# Patient Record
Sex: MALE | Race: WHITE | NOT HISPANIC OR LATINO | Employment: UNEMPLOYED | URBAN - METROPOLITAN AREA
[De-identification: names, ages, dates, MRNs, and addresses within clinical notes are randomized per-mention and may not be internally consistent; named-entity substitution may affect disease eponyms.]

---

## 2019-01-29 ENCOUNTER — HOSPITAL ENCOUNTER (OUTPATIENT)
Dept: LAB | Facility: HOSPITAL | Age: 54
Discharge: HOME | End: 2019-01-29
Attending: NURSE PRACTITIONER | Admitting: NURSE PRACTITIONER

## 2019-01-29 LAB
ALBUMIN SERPL-MCNC: 4 G/DL (ref 3.5–4.8)
ALBUMIN/GLOB SERPL: 1.3 {RATIO} (ref 1–1.7)
ALP SERPL-CCNC: 60 IU/L (ref 32–91)
ALT SERPL-CCNC: 16 IU/L (ref 17–63)
ANION GAP SERPL CALC-SCNC: 13.8 MMOL/L (ref 10–20)
AST SERPL-CCNC: 23 IU/L (ref 15–41)
BASOPHILS # BLD AUTO: 0.1 10*3/UL (ref 0–0.2)
BASOPHILS NFR BLD AUTO: 1 % (ref 0–2)
BILIRUB SERPL-MCNC: 0.7 MG/DL (ref 0.3–1.2)
BUN SERPL-MCNC: 7 MG/DL (ref 8–20)
BUN/CREAT SERPL: 7 (ref 6.2–20.3)
CALCIUM SERPL-MCNC: 9.1 MG/DL (ref 8.9–10.3)
CHLORIDE SERPL-SCNC: 103 MMOL/L (ref 101–111)
CHOLEST SERPL-MCNC: 228 MG/DL
CHOLEST/HDLC SERPL: 5.3 {RATIO}
CONV CO2: 23 MMOL/L (ref 22–32)
CONV LDL CHOLESTEROL DIRECT: 176 MG/DL (ref 0–100)
CONV TOTAL PROTEIN: 7.2 G/DL (ref 6.1–7.9)
CREAT UR-MCNC: 1 MG/DL (ref 0.7–1.2)
DIFFERENTIAL METHOD BLD: (no result)
EOSINOPHIL # BLD AUTO: 0.1 10*3/UL (ref 0–0.3)
EOSINOPHIL # BLD AUTO: 1 % (ref 0–3)
ERYTHROCYTE [DISTWIDTH] IN BLOOD BY AUTOMATED COUNT: 13.9 % (ref 11.5–14.5)
GLOBULIN UR ELPH-MCNC: 3.2 G/DL (ref 2.5–3.8)
GLUCOSE SERPL-MCNC: 99 MG/DL (ref 65–99)
HCT VFR BLD AUTO: 45.7 % (ref 40–54)
HDLC SERPL-MCNC: 43 MG/DL
HGB BLD-MCNC: 15.3 G/DL (ref 14–18)
LDLC/HDLC SERPL: 4.1 {RATIO}
LIPID INTERPRETATION: ABNORMAL
LYMPHOCYTES # BLD AUTO: 2.2 10*3/UL (ref 0.8–4.8)
LYMPHOCYTES NFR BLD AUTO: 27 % (ref 18–42)
MCH RBC QN AUTO: 28.5 PG (ref 26–32)
MCHC RBC AUTO-ENTMCNC: 33.5 G/DL (ref 32–36)
MCV RBC AUTO: 85 FL (ref 80–94)
MONOCYTES # BLD AUTO: 0.7 10*3/UL (ref 0.1–1.3)
MONOCYTES NFR BLD AUTO: 8 % (ref 2–11)
NEUTROPHILS # BLD AUTO: 5.2 10*3/UL (ref 2.3–8.6)
NEUTROPHILS NFR BLD AUTO: 63 % (ref 50–75)
NRBC BLD AUTO-RTO: 0 /100{WBCS}
NRBC/RBC NFR BLD MANUAL: 0 10*3/UL
PLATELET # BLD AUTO: 462 10*3/UL (ref 150–450)
PMV BLD AUTO: 7.3 FL (ref 7.4–10.4)
POTASSIUM SERPL-SCNC: 3.8 MMOL/L (ref 3.6–5.1)
RBC # BLD AUTO: 5.38 10*6/UL (ref 4.6–6)
SODIUM SERPL-SCNC: 136 MMOL/L (ref 136–144)
TRIGL SERPL-MCNC: 86 MG/DL
VLDLC SERPL CALC-MCNC: 8.8 MG/DL
WBC # BLD AUTO: 8.2 10*3/UL (ref 4.5–11.5)

## 2019-02-12 ENCOUNTER — HOSPITAL ENCOUNTER (OUTPATIENT)
Dept: GENERAL RADIOLOGY | Facility: HOSPITAL | Age: 54
Discharge: HOME | End: 2019-02-12
Attending: NURSE PRACTITIONER | Admitting: NURSE PRACTITIONER

## 2021-05-20 ENCOUNTER — OFFICE VISIT CONVERTED (OUTPATIENT)
Dept: FAMILY MEDICINE CLINIC | Facility: CLINIC | Age: 56
End: 2021-05-20
Attending: FAMILY MEDICINE

## 2021-05-20 ENCOUNTER — HOSPITAL ENCOUNTER (OUTPATIENT)
Dept: FAMILY MEDICINE CLINIC | Facility: CLINIC | Age: 56
Discharge: HOME | End: 2021-05-20
Attending: FAMILY MEDICINE

## 2021-05-20 LAB
ALBUMIN SERPL-MCNC: 4.4 G/DL (ref 3.5–5)
ALBUMIN/GLOB SERPL: 1.1 {RATIO} (ref 1.4–2.6)
ALP SERPL-CCNC: 73 U/L (ref 56–119)
ALT SERPL-CCNC: 9 U/L (ref 10–40)
ANION GAP SERPL CALC-SCNC: 18 MMOL/L (ref 8–19)
AST SERPL-CCNC: 14 U/L (ref 15–50)
BASOPHILS # BLD AUTO: 0.08 10*3/UL (ref 0–0.2)
BASOPHILS NFR BLD AUTO: 0.7 % (ref 0–3)
BILIRUB SERPL-MCNC: 0.35 MG/DL (ref 0.2–1.3)
BUN SERPL-MCNC: 14 MG/DL (ref 5–25)
BUN/CREAT SERPL: 15 {RATIO} (ref 6–20)
CALCIUM SERPL-MCNC: 9.7 MG/DL (ref 8.7–10.4)
CHLORIDE SERPL-SCNC: 102 MMOL/L (ref 99–111)
CHOLEST SERPL-MCNC: 240 MG/DL (ref 107–200)
CHOLEST/HDLC SERPL: 4.7 {RATIO} (ref 3–6)
CONV ABS IMM GRAN: 0.04 10*3/UL (ref 0–0.2)
CONV CO2: 23 MMOL/L (ref 22–32)
CONV IMMATURE GRAN: 0.3 % (ref 0–1.8)
CONV TOTAL PROTEIN: 8.3 G/DL (ref 6.3–8.2)
CREAT UR-MCNC: 0.96 MG/DL (ref 0.7–1.2)
DEPRECATED RDW RBC AUTO: 43.2 FL (ref 35.1–43.9)
EOSINOPHIL # BLD AUTO: 0.8 10*3/UL (ref 0–0.7)
EOSINOPHIL # BLD AUTO: 6.6 % (ref 0–7)
ERYTHROCYTE [DISTWIDTH] IN BLOOD BY AUTOMATED COUNT: 13.9 % (ref 11.6–14.4)
FOLATE SERPL-MCNC: 11 NG/ML (ref 4.8–20)
GFR SERPLBLD BASED ON 1.73 SQ M-ARVRAT: >60 ML/MIN/{1.73_M2}
GLOBULIN UR ELPH-MCNC: 3.9 G/DL (ref 2–3.5)
GLUCOSE SERPL-MCNC: 93 MG/DL (ref 70–99)
HCT VFR BLD AUTO: 44 % (ref 42–52)
HDLC SERPL-MCNC: 51 MG/DL (ref 40–60)
HGB BLD-MCNC: 14.2 G/DL (ref 14–18)
LDLC SERPL CALC-MCNC: 173 MG/DL (ref 70–100)
LYMPHOCYTES # BLD AUTO: 1.56 10*3/UL (ref 1–5)
LYMPHOCYTES NFR BLD AUTO: 12.9 % (ref 20–45)
MCH RBC QN AUTO: 27.7 PG (ref 27–31)
MCHC RBC AUTO-ENTMCNC: 32.3 G/DL (ref 33–37)
MCV RBC AUTO: 85.9 FL (ref 80–96)
MONOCYTES # BLD AUTO: 0.88 10*3/UL (ref 0.2–1.2)
MONOCYTES NFR BLD AUTO: 7.3 % (ref 3–10)
NEUTROPHILS # BLD AUTO: 8.72 10*3/UL (ref 2–8)
NEUTROPHILS NFR BLD AUTO: 72.2 % (ref 30–85)
NRBC CBCN: 0 % (ref 0–0.7)
OSMOLALITY SERPL CALC.SUM OF ELEC: 288 MOSM/KG (ref 273–304)
PLATELET # BLD AUTO: 554 10*3/UL (ref 130–400)
PMV BLD AUTO: 9.4 FL (ref 9.4–12.4)
POTASSIUM SERPL-SCNC: 4.2 MMOL/L (ref 3.5–5.3)
PSA SERPL-MCNC: 1.96 NG/ML (ref 0–4)
RBC # BLD AUTO: 5.12 10*6/UL (ref 4.7–6.1)
SODIUM SERPL-SCNC: 139 MMOL/L (ref 135–147)
T4 FREE SERPL-MCNC: 1.1 NG/DL (ref 0.9–1.8)
TRIGL SERPL-MCNC: 79 MG/DL (ref 40–150)
TSH SERPL-ACNC: 2.3 M[IU]/L (ref 0.27–4.2)
VIT B12 SERPL-MCNC: 273 PG/ML (ref 211–911)
VLDLC SERPL-MCNC: 16 MG/DL (ref 5–37)
WBC # BLD AUTO: 12.08 10*3/UL (ref 4.8–10.8)

## 2021-05-27 ENCOUNTER — OFFICE VISIT CONVERTED (OUTPATIENT)
Dept: FAMILY MEDICINE CLINIC | Facility: CLINIC | Age: 56
End: 2021-05-27
Attending: FAMILY MEDICINE

## 2021-05-27 ENCOUNTER — HOSPITAL ENCOUNTER (OUTPATIENT)
Dept: FAMILY MEDICINE CLINIC | Facility: CLINIC | Age: 56
Discharge: HOME | End: 2021-05-27
Attending: FAMILY MEDICINE

## 2021-05-27 LAB
BASOPHILS # BLD AUTO: 0.09 10*3/UL (ref 0–0.2)
BASOPHILS NFR BLD AUTO: 0.5 % (ref 0–3)
CONV ABS IMM GRAN: 0.08 10*3/UL (ref 0–0.2)
CONV IMMATURE GRAN: 0.4 % (ref 0–1.8)
DEPRECATED RDW RBC AUTO: 42.3 FL (ref 35.1–43.9)
EOSINOPHIL # BLD AUTO: 0.15 10*3/UL (ref 0–0.7)
EOSINOPHIL # BLD AUTO: 0.8 % (ref 0–7)
ERYTHROCYTE [DISTWIDTH] IN BLOOD BY AUTOMATED COUNT: 13.7 % (ref 11.6–14.4)
HCT VFR BLD AUTO: 43.6 % (ref 42–52)
HGB BLD-MCNC: 14.1 G/DL (ref 14–18)
LYMPHOCYTES # BLD AUTO: 2.81 10*3/UL (ref 1–5)
LYMPHOCYTES NFR BLD AUTO: 15.7 % (ref 20–45)
MCH RBC QN AUTO: 27.4 PG (ref 27–31)
MCHC RBC AUTO-ENTMCNC: 32.3 G/DL (ref 33–37)
MCV RBC AUTO: 84.8 FL (ref 80–96)
MONOCYTES # BLD AUTO: 2.19 10*3/UL (ref 0.2–1.2)
MONOCYTES NFR BLD AUTO: 12.3 % (ref 3–10)
NEUTROPHILS # BLD AUTO: 12.53 10*3/UL (ref 2–8)
NEUTROPHILS NFR BLD AUTO: 70.3 % (ref 30–85)
NRBC CBCN: 0 % (ref 0–0.7)
PLATELET # BLD AUTO: 706 10*3/UL (ref 130–400)
PMV BLD AUTO: 9.2 FL (ref 9.4–12.4)
RBC # BLD AUTO: 5.14 10*6/UL (ref 4.7–6.1)
WBC # BLD AUTO: 17.85 10*3/UL (ref 4.8–10.8)

## 2021-06-01 ENCOUNTER — HOSPITAL ENCOUNTER (OUTPATIENT)
Dept: OTHER | Facility: HOSPITAL | Age: 56
Discharge: HOME | End: 2021-06-01
Attending: FAMILY MEDICINE

## 2021-06-02 ENCOUNTER — TRANSCRIBE ORDERS (OUTPATIENT)
Dept: FAMILY MEDICINE CLINIC | Facility: CLINIC | Age: 56
End: 2021-06-02

## 2021-06-02 DIAGNOSIS — Z79.899 DRUG THERAPY: Primary | ICD-10-CM

## 2021-06-05 NOTE — PROGRESS NOTES
Progress Note      Patient Name: Eric Lopez   Patient ID: [id number]   Sex: Male   YOB: 1965        Visit Date: May 27, 2021    Provider: Bobby Monsivais MD   [place] [facility] [place] [address] [place] [address]   [place]  [address]   Location Phone: [phone]          Chief Complaint     1 WEEK FOLLOW UP ON BP       History Of Present Illness  Eric Lopez is a 56 year old /White male who presents for evaluation and treatment of:      discussed labs  vitamin b12 deficiency  leukocytosis  thrombocytosis- if still elevated will refer to hematology  HTN- controlled  hyperlipidemia- uncontrolled- aha risk 10.8 %- discussed R&B of statin- pt will stop med and call us if he has severe muscle cramps when he starts med- will get repeat CMP in 1 month to check liver  pt tolerating meds well       Past Medical History  Disease Name Date Onset Notes   Asthma --  --    COPD (chronic obstructive pulmonary disease) --  --    Lipoma --  --          Past Surgical History  Procedure Name Date Notes   Lipoma Removal --  --    Teeth extraction --  --          Medication List  Name Date Started Instructions   albuterol sulfate 90 mcg/actuation inhalation HFA aerosol inhaler  --    cyclobenzaprine 5 mg oral tablet  take 1 tablet (5 mg) by oral route once daily   Cymbalta 30 mg oral capsule,delayed release(/EC) 05/20/2021 take 1 capsule (30 mg) by oral route once daily for 30 days   lisinopril 10 mg oral tablet 05/20/2021 take 1 tablet (10 mg) by oral route once daily for 30 days   metoprolol tartrate 25 mg oral tablet 05/20/2021 take 1 tablet (25 mg) by oral route 2 times per day for 30 days   Symbicort 160-4.5 mcg/actuation inhalation HFA aerosol inhaler 05/20/2021 inhale 2 puffs by inhalation route 2 times per day in the morning and evening for 30 days   Ventolin HFA 90 mcg/actuation inhalation HFA aerosol inhaler 05/20/2021 inhale 1 - 2 puffs (90 - 180  mcg) by inhalation route every 6 hours as needed for 30 days         Allergy List  Allergen Name Date Reaction Notes   NO KNOWN DRUG ALLERGIES --  --  --          Social History  Finding Status Start/Stop Quantity Notes   Alcohol Former --/-- --  quit at age 36   Caffeine Current some day --/-- --  --    Marijuana Current every day --/-- --  --    Tobacco Former --/-- --  quit 2016         Review of Systems  · Constitutional  o Denies  o : fatigue, fever  · Cardiovascular  o Denies  o : chest pain, palpitations  · Respiratory  o Admits  o : cough  o Denies  o : shortness of breath  · Psychiatric  o Denies  o : anxiety, depression      Vitals  Date Time BP Position Site L\R Cuff Size HR RR TEMP (F) WT  HT  BMI kg/m2 BSA m2 O2 Sat FR L/min FiO2 HC       05/27/2021 02:52 /76 Sitting    73 - R  96.6 227lbs 2oz    97 %            Physical Examination  · Constitutional  o Appearance  o : well developed, well-nourished, in no acute distress  · Respiratory  o Respiratory Effort  o : breathing unlabored  o Auscultation of Lungs  o : clear to ascultation  · Cardiovascular  o Heart  o :   § Auscultation of Heart  § : regular rate and rhythm  o Peripheral Vascular System  o :   § Extremities  § : no edema  · Gastrointestinal  o Abdomen  o : soft, non-tender, non-distended, + bowel sounds, no hepatosplenomegaly, no masses palpated  · Musculoskeletal  o General  o :   § General Musculoskeletal  § : No joint swelling or deformity., Muscle tone, strength, and development grossly normal.  · Neurologic  o Gait and Station  o :   § Gait Screening  § : normal gait  · Psychiatric  o Mood and Affect  o : mood normal, affect appropriate          Assessment  · Vitamin B12 deficiency     266.2/E53.8  · Hyperlipidemia     272.4/E78.5  · Leukocytosis     288.60/D72.829  · Thrombocytosis     238.71/D47.3  · Drug therapy     V58.69/Z79.899      Plan  · Orders  o CBC with Auto Diff [facility] (01426) - 266.2/E53.8, 288.60/D72.829, 238.71/D47.3 -  05/27/2021  o CMP [facility] ([id number]) - V58.69/Z79.899 - 06/27/2021  o ACO-39: Current medications updated and reviewed (1159F, ) - - 05/27/2021  · Medications  o cyanocobalamin (vitamin B-12) 1,000 mcg/mL injection solution   SIG: inject 1 milliliter (1,000 mcg) by subcutaneous route once a month for 30 days   DISP: (1) Milliliter with 5 refills  Prescribed on 05/27/2021     o aspirin 81 mg oral tablet,delayed release (DR/EC)   SIG: take 1 tablet (81 mg) by oral route once daily for 30 days   DISP: (30) Tablet with 5 refills  Prescribed on 05/27/2021     o atorvastatin 10 mg oral tablet   SIG: take 1 tablet (10 mg) by oral route once daily at bedtime for 30 days   DISP: (30) Tablet with 5 refills  Prescribed on 05/27/2021     o cephalexin 500 mg oral capsule   SIG: take 1 capsule (500 mg) by oral route every 6 hours for 7 days   DISP: (28) Capsule with 0 refills  Prescribed on 05/27/2021     o Probiotic 20 billion cell oral capsule   SIG: take 1 capsule by oral route 2 times a day for 10 days   DISP: (20) Capsule with 0 refills  Prescribed on 05/27/2021     o Symbicort 160-4.5 mcg/actuation inhalation HFA aerosol inhaler   SIG: inhale 2 puffs by inhalation route 2 times per day in the morning and evening for 30 days   DISP: (1) Inhaler with 5 refills  Adjusted on 05/27/2021     o Medications have been Reconciled  o Transition of Care or Provider Policy  · Instructions  o Advised that cheeses and other sources of dairy fats, animal fats, fast food, and the extras (candy, pasteries, pies, doughnuts and cookies) all contain LDL raising nutrients. Advised to increase fruits, vegetables, whole grains, and to monitor portion sizes.   o Patient was educated/instructed on their diagnosis, treatment and medications prior to discharge from the clinic today.            Electronically Signed by: Bobby Monsivais MD -Author on May 27, 2021 03:21:45 PM

## 2021-06-05 NOTE — PROGRESS NOTES
Progress Note      Patient Name: Eric Lopez   Patient ID: [id number]   Sex: Male   YOB: 1965        Visit Date: May 20, 2021    Provider: Bobby Monsivais MD   [place] [facility] [place] [address] [place] [address]   [place]  [address]   Location Phone: [phone]          Chief Complaint     New patient established. Out of all medications for 1.5 years. Painful lipomas under right arm pit x four months.       History Of Present Illness  Eric Lopez is a 56 year old /White male who presents for evaluation and treatment of:      pt last saw PMD 2 yrs ago- pt has been out of meds for quite some time  pt is here to get established  pt has h/o asthma, h/o palpitations, h/o lipomas since 2001    pt smoked for 40 yrs 1-2 ppd- quit 5 yrs ago  pt has had chest wall pain with deep breathing x 2 months  HTN- uncontrolled  pt has long h/o depression due to pain from lipomas    EKG: NSR, no ST or T wave changes  xrays:NAD       Past Medical History  Disease Name Date Onset Notes   Asthma --  --    COPD (chronic obstructive pulmonary disease) --  --    Lipoma --  --          Past Surgical History  Procedure Name Date Notes   Lipoma Removal --  --    Teeth extraction --  --          Medication List  Name Date Started Instructions   albuterol sulfate 90 mcg/actuation inhalation HFA aerosol inhaler  --    cyclobenzaprine 5 mg oral tablet  take 1 tablet (5 mg) by oral route once daily   metoprolol tartrate 25 mg oral tablet 05/20/2021 take 1 tablet (25 mg) by oral route 2 times per day for 30 days   Symbicort 160-4.5 mcg/actuation inhalation HFA aerosol inhaler 05/20/2021 inhale 2 puffs by inhalation route 2 times per day in the morning and evening for 30 days         Allergy List  Allergen Name Date Reaction Notes   NO KNOWN DRUG ALLERGIES --  --  --          Social History  Finding Status Start/Stop Quantity Notes   Alcohol Former --/-- --  quit at age 36  "  Caffeine Current some day --/-- --  --    Marijuana Current every day --/-- --  --    Tobacco Former --/-- --  quit 2016         Review of Systems  · Constitutional  o Admits  o : fatigue  o Denies  o : fever, chills  · HENT  o Denies  o : nasal congestion, nasal discharge, sore throat  · Cardiovascular  o Admits  o : palpitations  o Denies  o : chest pain, syncope  · Respiratory  o Admits  o : shortness of breath, cough  · Gastrointestinal  o Denies  o : nausea, vomiting, diarrhea  · Integument  o Denies  o : rash  · Psychiatric  o Admits  o : anxiety, depression  o Denies  o : suicidal ideation, homicidal ideation      Vitals  Date Time BP Position Site L\R Cuff Size HR RR TEMP (F) WT  HT  BMI kg/m2 BSA m2 O2 Sat FR L/min FiO2 HC       05/20/2021 02:07 /110 Sitting    80 - R    6'  4\"   98 %      05/20/2021 02:22 /110 Sitting                       Physical Examination  · Constitutional  o Appearance  o : well developed, well-nourished, in no acute distress  · Head and Face  o HEENT  o : Unremarkable, MMM  · Neck  o Inspection/Palpation  o : supple  o Thyroid  o : no thyromegaly  · Respiratory  o Respiratory Effort  o : breathing unlabored  o Auscultation of Lungs  o : clear to ascultation  · Cardiovascular  o Heart  o :   § Auscultation of Heart  § : regular rate and rhythm  o Peripheral Vascular System  o :   § Extremities  § : no edema  · Gastrointestinal  o Abdomen  o : soft, non-tender, non-distended, + bowel sounds, no hepatosplenomegaly, no masses palpated  · Lymphatic  o Neck  o : no lymphadenopathy present in neck  · Musculoskeletal  o General  o :   § General Musculoskeletal  § : No joint swelling or deformity., Muscle tone, strength, and development grossly normal.  · Skin and Subcutaneous Tissue  o General Inspection  o : right axilla 1-2cm soft moveable, tender mass, no redness, warmth, or discharge, or fluctuance  · Neurologic  o Gait and Station  o :   § Gait Screening  § : normal " gait  · Psychiatric  o Mood and Affect  o : mood normal, affect appropriate          Assessment  · Asthma     493.90/J45.909  · COPD (chronic obstructive pulmonary disease)     496/J44.9  · Essential hypertension     401.9/I10  · Tobacco abuse     305.1/Z72.0  · Chest wall pain     786.52/R07.89  · Palpitations     785.1/R00.2  · Screen for colon cancer     V76.51/Z12.11  · Screening PSA (prostate specific antigen)     V76.44/Z12.5  · Axillary mass, right     782.2/R22.31  · Depression     311/F32.9      Plan  · Orders  o CBC with Auto Diff [facility] (97428) - 401.9/I10 - 05/20/2021  o CMP [facility] (70818) - 401.9/I10 - 05/20/2021  o Lipid Panel [facility] (77268) - 401.9/I10 - 05/20/2021  o Thyroid Profile (THYII) - 401.9/I10 - 05/20/2021  o ACO-18: Positive screen for clinical depression using a standardized tool and a follow-up plan documented () - - 05/20/2021  o ACO-39: Current medications updated and reviewed (1159F, ) - - 05/20/2021  o Low dose CT scan (LDCT) for lung cancer screening [facility] () - 305.1/Z72.0 - 05/20/2021  o Xray chest 2 views [facility] (42707) - 786.52/R07.89 - 05/20/2021  o CARDIOLOGY CONSULTATION (CARDI) - 785.1/R00.2 - 05/20/2021  o EKG (Recording and Interpretation) [facility] (Done and read at [facility]) (08804) - 785.1/R00.2 - 05/20/2021  o Screening colonoscopy for high risk patient () - V76.51/Z12.11 - 05/20/2021   pt had 7 polyps eight years ago  o PSA Ultrasensitive, ANNUAL SCREENING [facility] (09161) - V76.44/Z12.5 - 05/20/2021  o Ultrasound of right axilla (37283) - 782.2/R22.31 - 05/20/2021  o General Surgery Consult (GNSUR) - 782.2/R22.31 - 05/20/2021  o Vitamin B12 and folate measurement (33614, 93737) - 785.1/R00.2, 311/F32.9 - 05/20/2021  · Medications  o Cymbalta 30 mg oral capsule,delayed release(DR/EC)   SIG: take 1 capsule (30 mg) by oral route once daily for 30 days   DISP: (30) Capsule with 1 refills  Prescribed on 05/20/2021     o Ventolin HFA 90 mcg/actuation inhalation HFA  aerosol inhaler   SIG: inhale 1 - 2 puffs (90 - 180 mcg) by inhalation route every 6 hours as needed for 30 days   DISP: (1) Inhaler with 5 refills  Prescribed on 05/20/2021     o lisinopril 10 mg oral tablet   SIG: take 1 tablet (10 mg) by oral route once daily for 30 days   DISP: (30) Tablet with 5 refills  Prescribed on 05/20/2021     o cephalexin 500 mg oral capsule   SIG: take 1 capsule (500 mg) by oral route every 6 hours for 7 days   DISP: (28) Capsule with 0 refills  Prescribed on 05/20/2021     o prednisone 20 mg oral tablet   SIG: take 2 tablets by oral route daily for 5 days   DISP: (10) Tablet with 0 refills  Prescribed on 05/20/2021     o metoprolol tartrate 25 mg oral tablet   SIG: take 1 tablet (25 mg) by oral route 2 times per day for 30 days   DISP: (60) Tablet with 5 refills  Prescribed on 05/20/2021     o Symbicort 160-4.5 mcg/actuation inhalation HFA aerosol inhaler   SIG: inhale 2 puffs by inhalation route 2 times per day in the morning and evening for 30 days   DISP: (1) Inhaler with 5 refills  Prescribed on 05/20/2021     o Medications have been Reconciled  o Transition of Care or Provider Policy  · Instructions  o Patient was educated/instructed on their diagnosis, treatment and medications prior to discharge from the clinic today.            Electronically Signed by: Bobby Monsivais MD -Author on May 20, 2021 03:06:44 PM

## 2021-06-08 ENCOUNTER — TELEPHONE (OUTPATIENT)
Dept: FAMILY MEDICINE CLINIC | Facility: CLINIC | Age: 56
End: 2021-06-08

## 2021-06-08 NOTE — TELEPHONE ENCOUNTER
Caller: RAPHAEL WRIGHT    Relationship: Emergency Contact    Best call back number: [phone]    What is the best time to reach you: ANY    What was the call regarding: PATIENT'S WIFE CALLED TO REPORT THE RX THEY PICKED UP YESTERDAY THEY HAD TO PAY OUT OF POCKET AND WANTED TO DISCUSS THIS WITH DR YI.      SHE ALSO CALLED TO REQUEST AN UPDATE AS TO WHEN THE PATIENT'S CATSCAN WILL HAVE COMPLETED.    PLEASE ADVISE, THANK YOU.    Do you require a callback: YES

## 2021-06-10 ENCOUNTER — TRANSCRIBE ORDERS (OUTPATIENT)
Dept: ADMINISTRATIVE | Facility: HOSPITAL | Age: 56
End: 2021-06-10

## 2021-06-10 DIAGNOSIS — F17.210 CIGARETTE SMOKER: Primary | ICD-10-CM

## 2021-06-10 DIAGNOSIS — F17.210 NICOTINE DEPENDENCE, CIGARETTES, UNCOMPLICATED: Primary | ICD-10-CM

## 2021-06-14 ENCOUNTER — OFFICE VISIT (OUTPATIENT)
Dept: SURGERY | Facility: CLINIC | Age: 56
End: 2021-06-14

## 2021-06-14 VITALS — WEIGHT: 228.8 LBS | BODY MASS INDEX: 27.86 KG/M2 | RESPIRATION RATE: 15 BRPM | HEIGHT: 76 IN

## 2021-06-14 DIAGNOSIS — Z00.00: Primary | ICD-10-CM

## 2021-06-14 PROBLEM — J44.9 COPD (CHRONIC OBSTRUCTIVE PULMONARY DISEASE) (HCC): Status: ACTIVE | Noted: 2021-06-14

## 2021-06-14 PROBLEM — D17.9 LIPOMA: Status: ACTIVE | Noted: 2021-06-14

## 2021-06-14 PROBLEM — J45.909 ASTHMA: Status: ACTIVE | Noted: 2021-06-14

## 2021-06-14 PROCEDURE — 99202 OFFICE O/P NEW SF 15 MIN: CPT | Performed by: SURGERY

## 2021-06-14 RX ORDER — LISINOPRIL 10 MG/1
TABLET ORAL
COMMUNITY
Start: 2021-05-20

## 2021-06-14 RX ORDER — ATORVASTATIN CALCIUM 10 MG/1
TABLET, FILM COATED ORAL
COMMUNITY
Start: 2021-05-27

## 2021-06-14 RX ORDER — CYANOCOBALAMIN 1000 UG/ML
INJECTION, SOLUTION INTRAMUSCULAR; SUBCUTANEOUS
COMMUNITY
Start: 2021-05-27

## 2021-06-14 RX ORDER — DULOXETIN HYDROCHLORIDE 30 MG/1
CAPSULE, DELAYED RELEASE ORAL
COMMUNITY
Start: 2021-05-20 | End: 2021-08-02

## 2021-06-14 RX ORDER — ASPIRIN 81 MG/1
TABLET, COATED ORAL
COMMUNITY
Start: 2021-05-27

## 2021-06-14 RX ORDER — BUDESONIDE AND FORMOTEROL FUMARATE DIHYDRATE 160; 4.5 UG/1; UG/1
AEROSOL RESPIRATORY (INHALATION)
COMMUNITY
Start: 2021-05-27

## 2021-06-14 RX ORDER — IBUPROFEN 200 MG
TABLET ORAL
COMMUNITY
Start: 2013-06-19 | End: 2021-07-14 | Stop reason: SDUPTHER

## 2021-06-14 RX ORDER — PREDNISONE 20 MG/1
TABLET ORAL
COMMUNITY
Start: 2021-05-20

## 2021-06-14 RX ORDER — ALBUTEROL SULFATE 90 UG/1
AEROSOL, METERED RESPIRATORY (INHALATION)
COMMUNITY
Start: 2021-05-20 | End: 2021-06-18

## 2021-06-14 NOTE — PROGRESS NOTES
"Chief Complaint  Possible right axillary mass    Subjective          Eric Lopez presents to [facility]     History of Present Illness    Patient was referred by Dr. Bobby Monsivais for a possible right axillary mass.  Patient saw Dr. Monsivais sometime within the past few weeks for a swollen and tender area at his right axilla.  Patient says he was prescribed antibiotics and the mass and tenderness went away.  An ultrasound was done of the area and showed a normal axilla.  I reviewed a copy of the ultrasound report.  Patient does not have any complaints today regarding his axilla.    Allergies: Patient has no known allergies.      Current Outpatient Medications:   •  Aspirin Low Dose 81 MG EC tablet, , Disp: , Rfl:   •  atorvastatin (LIPITOR) 10 MG tablet, , Disp: , Rfl:   •  cyanocobalamin 1000 MCG/ML injection, , Disp: , Rfl:   •  DULoxetine (CYMBALTA) 30 MG capsule, , Disp: , Rfl:   •  ibuprofen (ADVIL,MOTRIN) 200 MG tablet, IBUPROFEN, Disp: , Rfl:   •  lisinopril (PRINIVIL,ZESTRIL) 10 MG tablet, , Disp: , Rfl:   •  metoprolol tartrate (LOPRESSOR) 25 MG tablet, , Disp: , Rfl:   •  predniSONE (DELTASONE) 20 MG tablet, , Disp: , Rfl:   •  Symbicort 160-4.5 MCG/ACT inhaler, , Disp: , Rfl:   •  Ventolin  (90 Base) MCG/ACT inhaler, , Disp: , Rfl:      Medical History: has a past medical history of Asthma and Hypertension.     Surgical History: has a past surgical history that includes Colonoscopy w/ biopsies (01/01/2010).     Family History: family history includes Epilepsy in his sister; Lung cancer in his paternal grandfather.     Social History: reports that he has quit smoking. He has never used smokeless tobacco. He reports previous alcohol use.    Objective     Vital Signs:  Resp 15   Ht 193 cm (76\")   Wt 104 kg (228 lb 12.8 oz)   BMI 27.85 kg/m²   • Constitutional: wife present, reliable historian  • HENT:  NCAT, no visible deformities or lesions  • Eyes:  sclerae " clear, conjunctivae clear, EOMI  • Neck:  normal appearance, no masses, trachea midline  • Respiratory:  breathing not labored, respiratory effort appears normal  • Cardiovascular:  heart regular rate and rhythm  • Abdomen:  soft, nondistended    • Skin and subcutaneous tissue:  Exam focused at right axilla and there are no visible or palpable right axillary abnormalities  • Musculoskeletal: moving all extremities symmetrically & purposefully  • Neurologic:  no obvious motor or sensory deficits, normal gait, able to stand without difficulty, cerebellar function without any obvious abnormalities, alert & oriented x 3, speech clear  • Psychiatric:  judgment and insight intact, mood normal, affect appropriate, cooperative    Result Review :            Assessment     Diagnoses and all orders for this visit:    1. Normal axillary exam (Primary)    Plan: No further treatment or evaluation necessary regarding the patient's right axilla.   Patient will see me PRN.    Gerardo Dyer MD  06/14/2021    Electronically signed by Gerardo Dyer MD, 06/14/21, 12:28 PM EDT.

## 2021-06-16 ENCOUNTER — TRANSCRIBE ORDERS (OUTPATIENT)
Dept: FAMILY MEDICINE CLINIC | Facility: CLINIC | Age: 56
End: 2021-06-16

## 2021-06-18 RX ORDER — ALBUTEROL SULFATE 90 UG/1
1-2 AEROSOL, METERED RESPIRATORY (INHALATION) EVERY 6 HOURS PRN
Qty: 18 G | Refills: 4 | Status: SHIPPED | OUTPATIENT
Start: 2021-06-18

## 2021-06-29 ENCOUNTER — HOSPITAL ENCOUNTER (OUTPATIENT)
Dept: CT IMAGING | Facility: HOSPITAL | Age: 56
Discharge: HOME | End: 2021-06-29
Admitting: FAMILY MEDICINE

## 2021-06-29 DIAGNOSIS — F17.210 NICOTINE DEPENDENCE, CIGARETTES, UNCOMPLICATED: ICD-10-CM

## 2021-06-29 DIAGNOSIS — M54.9 BACK PAIN, UNSPECIFIED BACK LOCATION, UNSPECIFIED BACK PAIN LATERALITY, UNSPECIFIED CHRONICITY: Primary | ICD-10-CM

## 2021-06-29 PROCEDURE — 71271 CT THORAX LUNG CANCER SCR C-: CPT

## 2021-06-29 PROCEDURE — 71271 CT THORAX LUNG CANCER SCR C-: CPT | Performed by: RADIOLOGY

## 2021-06-29 RX ORDER — CYCLOBENZAPRINE HCL 10 MG
10 TABLET ORAL 3 TIMES DAILY PRN
Qty: 90 TABLET | Refills: 1 | Status: SHIPPED | OUTPATIENT
Start: 2021-06-29 | End: 2021-07-30 | Stop reason: SDUPTHER

## 2021-07-01 ENCOUNTER — OFFICE VISIT (OUTPATIENT)
Dept: FAMILY MEDICINE CLINIC | Facility: CLINIC | Age: 56
End: 2021-07-01

## 2021-07-01 VITALS
HEART RATE: 75 BPM | OXYGEN SATURATION: 95 % | TEMPERATURE: 96.4 F | SYSTOLIC BLOOD PRESSURE: 160 MMHG | BODY MASS INDEX: 28.02 KG/M2 | DIASTOLIC BLOOD PRESSURE: 98 MMHG | WEIGHT: 230.2 LBS

## 2021-07-01 DIAGNOSIS — E53.8 VITAMIN B 12 DEFICIENCY: Primary | ICD-10-CM

## 2021-07-01 DIAGNOSIS — R91.8 MASS OF RIGHT LUNG: ICD-10-CM

## 2021-07-01 PROCEDURE — 99213 OFFICE O/P EST LOW 20 MIN: CPT | Performed by: FAMILY MEDICINE

## 2021-07-01 PROCEDURE — 96372 THER/PROPH/DIAG INJ SC/IM: CPT | Performed by: FAMILY MEDICINE

## 2021-07-01 RX ORDER — TRAMADOL HYDROCHLORIDE 50 MG/1
50 TABLET ORAL EVERY 6 HOURS PRN
Qty: 40 TABLET | Refills: 0 | Status: SHIPPED | OUTPATIENT
Start: 2021-07-01 | End: 2021-07-14 | Stop reason: SDUPTHER

## 2021-07-01 RX ORDER — NAPROXEN SODIUM 220 MG
220 TABLET ORAL 2 TIMES DAILY PRN
COMMUNITY
End: 2021-07-14 | Stop reason: SDUPTHER

## 2021-07-01 RX ORDER — CYANOCOBALAMIN 1000 UG/ML
1000 INJECTION, SOLUTION INTRAMUSCULAR; SUBCUTANEOUS ONCE
Status: COMPLETED | OUTPATIENT
Start: 2021-07-01 | End: 2021-07-01

## 2021-07-01 RX ADMIN — CYANOCOBALAMIN 1000 MCG: 1000 INJECTION, SOLUTION INTRAMUSCULAR; SUBCUTANEOUS at 15:45

## 2021-07-01 NOTE — PROGRESS NOTES
Chief Complaint  Chest Pain (Follow up for CT results.)    Subjective          Eric Lopez presents to [facility]  Pt has no h/o seizures  Discussed CT chest right apex lung mass 8.5cm x 4.5cm- has destruction of right #1,2 ribs posteriorly- pt having a lot of chest wall pain      Objective   No Known Allergies    There is no immunization history on file for this patient.    Vital Signs:   Vitals:    07/01/21 1450   BP: 160/98   Pulse: 75   Temp: 96.4 °F (35.8 °C)   SpO2: 95%   Weight: 104 kg (230 lb 3.2 oz)       Physical Exam  Vitals reviewed.   Constitutional:       Appearance: Normal appearance. He is well-developed.   HENT:      Head: Normocephalic and atraumatic.      Right Ear: External ear normal.      Left Ear: External ear normal.      Mouth/Throat:      Pharynx: No oropharyngeal exudate.   Eyes:      Conjunctiva/sclera: Conjunctivae normal.      Pupils: Pupils are equal, round, and reactive to light.   Cardiovascular:      Rate and Rhythm: Normal rate and regular rhythm.      Pulses: Normal pulses.      Heart sounds: Normal heart sounds. No murmur heard.   No friction rub. No gallop.    Pulmonary:      Effort: Pulmonary effort is normal.      Breath sounds: Normal breath sounds. No wheezing or rhonchi.   Abdominal:      General: Bowel sounds are normal. There is no distension.      Palpations: Abdomen is soft.      Tenderness: There is no abdominal tenderness.   Skin:     General: Skin is warm and dry.   Neurological:      Mental Status: He is alert and oriented to person, place, and time.      Cranial Nerves: No cranial nerve deficit.   Psychiatric:         Mood and Affect: Mood and affect normal.         Behavior: Behavior normal.         Thought Content: Thought content normal.         Judgment: Judgment normal.        Result Review :                 Assessment and Plan    Diagnoses and all orders for this visit:    1. Mass of right lung (Primary)  -      Ambulatory Referral to Pulmonology  -     traMADol (Ultram) 50 MG tablet; Take 1 tablet by mouth Every 6 (Six) Hours As Needed for Moderate Pain .  Dispense: 40 tablet; Refill: 0            Follow Up   Return in about 2 weeks (around 7/15/2021) for Recheck.  Patient was given instructions and counseling regarding his condition or for health maintenance advice. Please see specific information pulled into the AVS if appropriate.       Answers for HPI/ROS submitted by the patient on 7/1/2021  What is the primary reason for your visit?: Other  Please describe your symptoms.: Hurting real bad under my right armpit something bigger is in there I cannot sleep. I need something for pain.  Have you had these symptoms before?: Yes  How long have you been having these symptoms?: Greater than 2 weeks  Please list any medications you are currently taking for this condition.: Ibuprofen and Aleve. And any meds you prescribed.  Please describe any probable cause for these symptoms. : Something is growing in my armpit and it keeps me awake I need sleep and to be pain free.

## 2021-07-12 ENCOUNTER — TRANSCRIBE ORDERS (OUTPATIENT)
Dept: ADMINISTRATIVE | Facility: HOSPITAL | Age: 56
End: 2021-07-12

## 2021-07-12 ENCOUNTER — OFFICE VISIT (OUTPATIENT)
Dept: PULMONOLOGY | Facility: CLINIC | Age: 56
End: 2021-07-12

## 2021-07-12 VITALS
HEIGHT: 76 IN | WEIGHT: 221 LBS | BODY MASS INDEX: 26.91 KG/M2 | RESPIRATION RATE: 17 BRPM | OXYGEN SATURATION: 99 % | TEMPERATURE: 97.7 F | SYSTOLIC BLOOD PRESSURE: 158 MMHG | HEART RATE: 124 BPM | DIASTOLIC BLOOD PRESSURE: 107 MMHG

## 2021-07-12 DIAGNOSIS — R91.8 MASS OF RIGHT LUNG: ICD-10-CM

## 2021-07-12 DIAGNOSIS — J44.9 CHRONIC OBSTRUCTIVE PULMONARY DISEASE, UNSPECIFIED COPD TYPE (HCC): Primary | ICD-10-CM

## 2021-07-12 DIAGNOSIS — R91.8 LUNG MASS: Primary | ICD-10-CM

## 2021-07-12 DIAGNOSIS — R91.8 MASS OF UPPER LOBE OF RIGHT LUNG: ICD-10-CM

## 2021-07-12 DIAGNOSIS — Z87.891 EX-CIGARETTE SMOKER: ICD-10-CM

## 2021-07-12 DIAGNOSIS — J45.909 ASTHMA, UNSPECIFIED ASTHMA SEVERITY, UNSPECIFIED WHETHER COMPLICATED, UNSPECIFIED WHETHER PERSISTENT: ICD-10-CM

## 2021-07-12 PROCEDURE — 99204 OFFICE O/P NEW MOD 45 MIN: CPT | Performed by: SPECIALIST

## 2021-07-12 NOTE — TELEPHONE ENCOUNTER
Caller: Eric Lopez    Relationship: Self    Best call back number: [phone]     Medication needed:   Requested Prescriptions     Pending Prescriptions Disp Refills   • traMADol (Ultram) 50 MG tablet 40 tablet 0     Sig: Take 1 tablet by mouth Every 6 (Six) Hours As Needed for Moderate Pain .       When do you need the refill by: ASAP    What additional details did the patient provide when requesting the medication: PATIENT IS OUT OF THIS MEDICATION. PATIENT STATED THAT HE SAW HIS LUNG DOCTOR TODAY AND HIS DOCTOR STATED HE SHOULD CONTINUE TO TAKE THIS MEDICATION    Does the patient have less than a 3 day supply:  [x] Yes  [] No    What is the patient's preferred pharmacy: [facility] [address] - [address] [place] [address] [phone] [facility] [phone]

## 2021-07-12 NOTE — PROGRESS NOTES
CONSULT NOTE     CHIEF COMPLAINT  Chief Complaint   Patient presents with   • Wheezing   • Shortness of Breath        Primary Care Provider  Bobby Monsivais MD     Referring Provider  Bobby Monsivais MD    Patient Complaint    ICD-10-CM ICD-9-CM   1. Chronic obstructive pulmonary disease, unspecified COPD type (CMS/[facility])  J44.9 496   2. Mass of upper lobe of right lung  R91.8 786.6   3. Ex-cigarette smoker  Z87.891 V15.82   4. Asthma, unspecified asthma severity, unspecified whether complicated, unspecified whether persistent  J45.909 493.90            Subjective          History of Presenting Illness  Eric Lopez is a 56 y.o. male the gentleman is unemployed and used to work different jobs in the past and he did not have any insurance and did not follow-up with any doctor.  Recently patient was being evaluated by the primary physician and as he is complaining of right upper quadrant of the chest pain, patient had the chest x-ray and the CT scan of the chest done and the CT scan of the chest which was done as low-dose screening which showed 8.5 cm smart mass/4.5 at the right lung apex with destruction of posterior first and second ribs and hence the patient is referred here.  Patient constantly complaining of chest pain.  Patient was given tramadol which is helping him some but he was wondering if he can get more pain medications.  Patient has shortness of breath on any exertion and cough with no hemoptysis.  Decrease the weight and not eating very well.  He also complaining of mass lesion in the right armpit.  He was diagnosed as having COPD in the past and had the pulmonary function testing done and the report is not available.    I have personally reviewed the review of systems, past family, social, medical and surgical histories; and agree with their findings.        Review of Systems: Other review the systems are noncontributory.    HISTORY    Family History   Problem Relation Age of Onset   •  Epilepsy Sister    • Lung cancer Paternal Grandfather         Social History     Socioeconomic History   • Marital status:      Spouse name: Not on file   • Number of children: Not on file   • Years of education: Not on file   • Highest education level: Not on file   Tobacco Use   • Smoking status: Former Smoker     Packs/day: 0.00     Years: 0.00     Pack years: 0.00     Types: Cigarettes     Quit date: 2015     Years since quittin.0   • Smokeless tobacco: Never Used   • Tobacco comment: quit    Vaping Use   • Vaping Use: Never used   Substance and Sexual Activity   • Alcohol use: Not Currently     Alcohol/week: 0.0 standard drinks     Comment: quit         Past Medical History:   Diagnosis Date   • Asthma    • COPD (chronic obstructive pulmonary disease) (CMS/[facility])    • Hypertension           There is no immunization history on file for this patient.    No Known Allergies       Current Outpatient Medications:   •  albuterol sulfate  (90 Base) MCG/ACT inhaler, Inhale 1-2 puffs Every 6 (Six) Hours As Needed for Wheezing., Disp: 18 g, Rfl: 4  •  Aspirin Low Dose 81 MG EC tablet, , Disp: , Rfl:   •  atorvastatin (LIPITOR) 10 MG tablet, , Disp: , Rfl:   •  cyanocobalamin 1000 MCG/ML injection, , Disp: , Rfl:   •  cyclobenzaprine (FLEXERIL) 10 MG tablet, Take 1 tablet by mouth 3 (Three) Times a Day As Needed for Muscle Spasms., Disp: 90 tablet, Rfl: 1  •  DULoxetine (CYMBALTA) 30 MG capsule, , Disp: , Rfl:   •  ibuprofen (ADVIL,MOTRIN) 200 MG tablet, IBUPROFEN, Disp: , Rfl:   •  lisinopril (PRINIVIL,ZESTRIL) 10 MG tablet, , Disp: , Rfl:   •  metoprolol tartrate (LOPRESSOR) 25 MG tablet, , Disp: , Rfl:   •  naproxen sodium (ALEVE) 220 MG tablet, Take 220 mg by mouth 2 (Two) Times a Day As Needed., Disp: , Rfl:   •  Symbicort 160-4.5 MCG/ACT inhaler, , Disp: , Rfl:   •  predniSONE (DELTASONE) 20 MG tablet, , Disp: , Rfl:   •  traMADol (Ultram) 50 MG tablet, Take 1 tablet by mouth Every 6  (Six) Hours As Needed for Moderate Pain ., Disp: 40 tablet, Rfl: 0     Smoking status: Former smoker and quit about 6 years ago.    Objective     Vital Signs:   Vitals:    07/12/21 0847   BP: (!) 158/107   Pulse: (!) 124   Resp: 17   Temp: 97.7 °F (36.5 °C)   SpO2: 99%        Physical Exam:  HEENT: Atraumatic, anicteric, no showed asymmetric nasal septum.  Mallampati class III airway.  Neck: Supple no JVD no masses palpable.  Chest and lungs: Decreased breath sounds with scattered rhonchi and tender in the right upper lobe area and also had a possible lymph nodes matted?  Palpable and tender in the right axilla.  Cardiovascular system: Sinus tachycardia.  Abdomen: Soft: Nontender, no masses palpable.  Extremities no clubbing, no cyanosis, no edema.  Central nervous system: Grossly intact.  Psychological: Appeared to be somewhat depressed and accompanied by his wife.     Result Review :   I have personally reviewed the CT scan of the chest report and the films and report is documented.  No pulmonary function testing is available.      Impression:  Lung mass on the right upper lobe with the destruction of the ribs suggestive of possible metastasis and also had a lymph node in the right armpit.  COPD.  Former smoker.  He has pets in the house with cats.  Present medications are helping but not significant.  High blood pressure with sinus tachycardia could be secondary to the pain.    Plan:  We will get the pulmonary function testing, bronchodilators and ABG.  Lab data as ordered.  CT-guided needle biopsy of the right upper lobe mass.  Follow-up 1 week after getting the biopsy with the report.  Patient to contact the primary physician for the pain management are to refer to the pain physician.  I believe that unless we treat his primary pain site, the medication may be temporary value.  Depending upon the results patient may need to be referred to the appropriate consultants soon as possible.  Patient was concerned  about removal of the tumor and based on my evaluation that may not be possible as the patient also appeared to be having COPD.  But depending upon the biopsy report will refer the patient to the radiation oncology, oncology and thoracic surgeon as needed.  Other medical problems and management is as per primary attending physician.    Follow Up   Return in about 1 week (around 7/19/2021) for after pft and after the biopsy pathology report.  Patient was given instructions and counseling regarding his condition or for health maintenance advice. Please see specific information pulled into the AVS if appropriate.     Rom Quinn MD

## 2021-07-14 DIAGNOSIS — R91.8 MASS OF RIGHT LUNG: ICD-10-CM

## 2021-07-14 RX ORDER — IBUPROFEN 200 MG
200 TABLET ORAL EVERY 6 HOURS PRN
Qty: 60 TABLET | Refills: 0 | Status: SHIPPED | OUTPATIENT
Start: 2021-07-14 | End: 2021-07-30 | Stop reason: SDUPTHER

## 2021-07-14 RX ORDER — TRAMADOL HYDROCHLORIDE 50 MG/1
50 TABLET ORAL EVERY 6 HOURS PRN
Qty: 40 TABLET | Refills: 0 | Status: SHIPPED | OUTPATIENT
Start: 2021-07-14 | End: 2021-07-20 | Stop reason: SDUPTHER

## 2021-07-14 RX ORDER — NAPROXEN SODIUM 220 MG
220 TABLET ORAL 2 TIMES DAILY PRN
Qty: 60 TABLET | Refills: 0 | Status: SHIPPED | OUTPATIENT
Start: 2021-07-14 | End: 2021-07-30

## 2021-07-14 NOTE — TELEPHONE ENCOUNTER
Caller: RAPHAEL WRIGHT    Relationship: Emergency Contact    Best call back number: [phone]    Medication needed:   Requested Prescriptions     Pending Prescriptions Disp Refills   • traMADol (Ultram) 50 MG tablet 40 tablet 0     Sig: Take 1 tablet by mouth Every 6 (Six) Hours As Needed for Moderate Pain .   • naproxen sodium (ALEVE) 220 MG tablet       Sig: Take 1 tablet by mouth 2 (Two) Times a Day As Needed.   • ibuprofen (ADVIL,MOTRIN) 200 MG tablet         When do you need the refill by: 07/14/21    What additional details did the patient provide when requesting the medication:     Does the patient have less than a 3 day supply:  [x] Yes  [] No    What is the patient's preferred pharmacy:    [facility] [address] [place] [address] [phone] [facility] [phone] FX

## 2021-07-15 ENCOUNTER — TRANSCRIBE ORDERS (OUTPATIENT)
Dept: LAB | Facility: HOSPITAL | Age: 56
End: 2021-07-15

## 2021-07-15 VITALS
OXYGEN SATURATION: 97 % | DIASTOLIC BLOOD PRESSURE: 76 MMHG | HEART RATE: 73 BPM | TEMPERATURE: 96.6 F | WEIGHT: 227.12 LBS | SYSTOLIC BLOOD PRESSURE: 134 MMHG | BODY MASS INDEX: 27.65 KG/M2

## 2021-07-15 VITALS
HEART RATE: 80 BPM | HEIGHT: 76 IN | DIASTOLIC BLOOD PRESSURE: 110 MMHG | SYSTOLIC BLOOD PRESSURE: 198 MMHG | OXYGEN SATURATION: 98 %

## 2021-07-15 DIAGNOSIS — Z01.818 PREOP TESTING: Primary | ICD-10-CM

## 2021-07-19 ENCOUNTER — APPOINTMENT (OUTPATIENT)
Dept: GENERAL RADIOLOGY | Facility: HOSPITAL | Age: 56
End: 2021-07-19

## 2021-07-19 ENCOUNTER — APPOINTMENT (OUTPATIENT)
Dept: CT IMAGING | Facility: HOSPITAL | Age: 56
End: 2021-07-19

## 2021-07-19 ENCOUNTER — HOSPITAL ENCOUNTER (EMERGENCY)
Facility: HOSPITAL | Age: 56
Discharge: HOME | End: 2021-07-19
Attending: EMERGENCY MEDICINE | Admitting: EMERGENCY MEDICINE

## 2021-07-19 VITALS
WEIGHT: 215.83 LBS | HEIGHT: 76 IN | HEART RATE: 82 BPM | TEMPERATURE: 98.1 F | RESPIRATION RATE: 18 BRPM | SYSTOLIC BLOOD PRESSURE: 129 MMHG | OXYGEN SATURATION: 97 % | BODY MASS INDEX: 26.28 KG/M2 | DIASTOLIC BLOOD PRESSURE: 89 MMHG

## 2021-07-19 DIAGNOSIS — I47.1 PSVT (PAROXYSMAL SUPRAVENTRICULAR TACHYCARDIA) (HCC): Primary | ICD-10-CM

## 2021-07-19 LAB
ALBUMIN SERPL-MCNC: 3.8 G/DL (ref 3.5–5.2)
ALBUMIN/GLOB SERPL: 1.2 G/DL
ALP SERPL-CCNC: 76 U/L (ref 39–117)
ALT SERPL W P-5'-P-CCNC: 11 U/L (ref 1–41)
ANION GAP SERPL CALCULATED.3IONS-SCNC: 13.6 MMOL/L (ref 5–15)
AST SERPL-CCNC: 12 U/L (ref 1–40)
BASOPHILS # BLD AUTO: 0.1 10*3/MM3 (ref 0–0.2)
BASOPHILS NFR BLD AUTO: 0.7 % (ref 0–1.5)
BILIRUB SERPL-MCNC: 0.3 MG/DL (ref 0–1.2)
BUN SERPL-MCNC: 13 MG/DL (ref 6–20)
BUN/CREAT SERPL: 11.5 (ref 7–25)
CALCIUM SPEC-SCNC: 9.1 MG/DL (ref 8.6–10.5)
CHLORIDE SERPL-SCNC: 99 MMOL/L (ref 98–107)
CO2 SERPL-SCNC: 23.4 MMOL/L (ref 22–29)
CREAT SERPL-MCNC: 1.13 MG/DL (ref 0.76–1.27)
DEPRECATED RDW RBC AUTO: 38.7 FL (ref 37–54)
EOSINOPHIL # BLD AUTO: 1.03 10*3/MM3 (ref 0–0.4)
EOSINOPHIL NFR BLD AUTO: 7.2 % (ref 0.3–6.2)
ERYTHROCYTE [DISTWIDTH] IN BLOOD BY AUTOMATED COUNT: 12.8 % (ref 12.3–15.4)
GFR SERPL CREATININE-BSD FRML MDRD: 67 ML/MIN/1.73
GIANT PLATELETS: NORMAL
GLOBULIN UR ELPH-MCNC: 3.3 GM/DL
GLUCOSE SERPL-MCNC: 133 MG/DL (ref 65–99)
HCT VFR BLD AUTO: 38.7 % (ref 37.5–51)
HGB BLD-MCNC: 12.9 G/DL (ref 13–17.7)
HOLD SPECIMEN: NORMAL
HOLD SPECIMEN: NORMAL
IMM GRANULOCYTES # BLD AUTO: 0.04 10*3/MM3 (ref 0–0.05)
IMM GRANULOCYTES NFR BLD AUTO: 0.3 % (ref 0–0.5)
LYMPHOCYTES # BLD AUTO: 1.09 10*3/MM3 (ref 0.7–3.1)
LYMPHOCYTES NFR BLD AUTO: 7.6 % (ref 19.6–45.3)
MAGNESIUM SERPL-MCNC: 2 MG/DL (ref 1.6–2.6)
MCH RBC QN AUTO: 27.6 PG (ref 26.6–33)
MCHC RBC AUTO-ENTMCNC: 33.3 G/DL (ref 31.5–35.7)
MCV RBC AUTO: 82.9 FL (ref 79–97)
MONOCYTES # BLD AUTO: 0.74 10*3/MM3 (ref 0.1–0.9)
MONOCYTES NFR BLD AUTO: 5.1 % (ref 5–12)
NEUTROPHILS NFR BLD AUTO: 11.4 10*3/MM3 (ref 1.7–7)
NEUTROPHILS NFR BLD AUTO: 79.1 % (ref 42.7–76)
NRBC BLD AUTO-RTO: 0 /100 WBC (ref 0–0.2)
PLATELET # BLD AUTO: 607 10*3/MM3 (ref 140–450)
PMV BLD AUTO: 8.8 FL (ref 6–12)
POIKILOCYTOSIS BLD QL SMEAR: NORMAL
POTASSIUM SERPL-SCNC: 3.1 MMOL/L (ref 3.5–5.2)
PROT SERPL-MCNC: 7.1 G/DL (ref 6–8.5)
RBC # BLD AUTO: 4.67 10*6/MM3 (ref 4.14–5.8)
SMALL PLATELETS BLD QL SMEAR: NORMAL
SODIUM SERPL-SCNC: 136 MMOL/L (ref 136–145)
TROPONIN T SERPL-MCNC: <0.01 NG/ML (ref 0–0.03)
WBC # BLD AUTO: 14.4 10*3/MM3 (ref 3.4–10.8)
WBC MORPH BLD: NORMAL
WHOLE BLOOD HOLD SPECIMEN: NORMAL

## 2021-07-19 PROCEDURE — 93005 ELECTROCARDIOGRAM TRACING: CPT | Performed by: EMERGENCY MEDICINE

## 2021-07-19 PROCEDURE — 93005 ELECTROCARDIOGRAM TRACING: CPT

## 2021-07-19 PROCEDURE — 93010 ELECTROCARDIOGRAM REPORT: CPT | Performed by: INTERNAL MEDICINE

## 2021-07-19 PROCEDURE — 0 IOPAMIDOL PER 1 ML: Performed by: EMERGENCY MEDICINE

## 2021-07-19 PROCEDURE — 99283 EMERGENCY DEPT VISIT LOW MDM: CPT

## 2021-07-19 PROCEDURE — 85025 COMPLETE CBC W/AUTO DIFF WBC: CPT | Performed by: EMERGENCY MEDICINE

## 2021-07-19 PROCEDURE — 85007 BL SMEAR W/DIFF WBC COUNT: CPT | Performed by: EMERGENCY MEDICINE

## 2021-07-19 PROCEDURE — 71045 X-RAY EXAM CHEST 1 VIEW: CPT

## 2021-07-19 PROCEDURE — 71260 CT THORAX DX C+: CPT

## 2021-07-19 PROCEDURE — 80053 COMPREHEN METABOLIC PANEL: CPT | Performed by: EMERGENCY MEDICINE

## 2021-07-19 PROCEDURE — 71260 CT THORAX DX C+: CPT | Performed by: RADIOLOGY

## 2021-07-19 PROCEDURE — 71045 X-RAY EXAM CHEST 1 VIEW: CPT | Performed by: RADIOLOGY

## 2021-07-19 PROCEDURE — 83735 ASSAY OF MAGNESIUM: CPT | Performed by: EMERGENCY MEDICINE

## 2021-07-19 PROCEDURE — 84484 ASSAY OF TROPONIN QUANT: CPT | Performed by: EMERGENCY MEDICINE

## 2021-07-19 RX ORDER — TRAMADOL HYDROCHLORIDE 50 MG/1
50 TABLET ORAL EVERY 6 HOURS PRN
Qty: 40 TABLET | Refills: 0 | OUTPATIENT
Start: 2021-07-19

## 2021-07-19 RX ORDER — TRAMADOL HYDROCHLORIDE 50 MG/1
TABLET ORAL
Qty: 20 TABLET | Refills: 0 | OUTPATIENT
Start: 2021-07-19

## 2021-07-19 RX ORDER — SODIUM CHLORIDE 0.9 % (FLUSH) 0.9 %
10 SYRINGE (ML) INJECTION AS NEEDED
Status: DISCONTINUED | OUTPATIENT
Start: 2021-07-19 | End: 2021-07-19 | Stop reason: HOSPADM

## 2021-07-19 RX ADMIN — IOPAMIDOL 100 ML: 755 INJECTION, SOLUTION INTRAVENOUS at 18:45

## 2021-07-19 RX ADMIN — SODIUM CHLORIDE 1000 ML: 9 INJECTION, SOLUTION INTRAVENOUS at 18:03

## 2021-07-19 NOTE — ED PROVIDER NOTES
Time: 17:07 EDT  Arrived by: EMS  Chief Complaint: Palpitaions  History provided by: Patient  History is limited by: N/A    History of Present Illness:  Patient is a 56 y.o. male that presents to the emergency department with palpitations. Per EMS, the patient had a heart rate of 200-220s on arrival. They gave him 6 MG and 12 MG x2 of adenosine PTA. Patient states he has a history of severe COPD.       History provided by:  EMS personnel and patient  Palpitations  Palpitations quality:  Fast  Timing:  Intermittent  Chronicity:  Recurrent  Exacerbated by: standing up.  Associated symptoms: dizziness, shortness of breath and weakness        Patient Care Team  Primary Care Provider: Bobby Monsivais    Past Medical History:     No Known Allergies  Past Medical History:   Diagnosis Date   • Asthma    • COPD (chronic obstructive pulmonary disease) (CMS/[facility])    • Hypertension      Past Surgical History:   Procedure Laterality Date   • COLONOSCOPY W/ BIOPSIES  01/01/2010     Family History   Problem Relation Age of Onset   • Epilepsy Sister    • Lung cancer Paternal Grandfather        Home Medications:  Prior to Admission medications    Medication Sig Start Date End Date Taking? Authorizing Provider   albuterol sulfate  (90 Base) MCG/ACT inhaler Inhale 1-2 puffs Every 6 (Six) Hours As Needed for Wheezing. 6/18/21   Bobby Monsivais MD   Aspirin Low Dose 81 MG EC tablet  5/27/21   Saroj Mata MD   atorvastatin (LIPITOR) 10 MG tablet  5/27/21   Saroj Mata MD   cyanocobalamin 1000 MCG/ML injection  5/27/21   Saroj Mata MD   cyclobenzaprine (FLEXERIL) 10 MG tablet Take 1 tablet by mouth 3 (Three) Times a Day As Needed for Muscle Spasms. 6/29/21   Bobby Monsivais MD   DULoxetine (CYMBALTA) 30 MG capsule  5/20/21   Saroj Mata MD   ibuprofen (ADVIL,MOTRIN) 200 MG tablet Take 1 tablet by mouth Every 6 (Six) Hours As Needed for Mild Pain . 7/14/21   Eileen Ko DO  "  lisinopril (PRINIVIL,ZESTRIL) 10 MG tablet  5/20/21   Saroj Mata MD   metoprolol tartrate (LOPRESSOR) 25 MG tablet  5/20/21   Saroj Mata MD   naproxen sodium (ALEVE) 220 MG tablet Take 1 tablet by mouth 2 (Two) Times a Day As Needed (pain). 7/14/21   Eileen Ko DO   predniSONE (DELTASONE) 20 MG tablet  5/20/21   Saroj Mata MD   Symbicort 160-4.5 MCG/ACT inhaler  5/27/21   Saroj Mata MD   traMADol (Ultram) 50 MG tablet Take 1 tablet by mouth Every 6 (Six) Hours As Needed for Moderate Pain . 7/14/21   Eileen Ko DO        Social History:   PT  reports that he quit smoking about 6 years ago. His smoking use included cigarettes. He smoked 0.00 packs per day for 0.00 years. He has never used smokeless tobacco. He reports previous alcohol use. He reports previous drug use.    Record Review:  I have reviewed the patient's records in [employer].     Review of Systems  Review of Systems   Constitutional: Negative for chills and fever.   HENT: Negative for congestion, rhinorrhea and sore throat.    Eyes: Negative for pain and visual disturbance.   Respiratory: Positive for shortness of breath. Negative for apnea and chest tightness.    Cardiovascular: Positive for palpitations.   Gastrointestinal: Negative for abdominal pain and diarrhea.   Genitourinary: Negative for difficulty urinating and dysuria.   Musculoskeletal: Negative for joint swelling and myalgias.   Skin: Negative for color change.   Neurological: Positive for dizziness and weakness. Negative for seizures and headaches.   Psychiatric/Behavioral: Negative.    All other systems reviewed and are negative.       Physical Exam  /95   Pulse 81   Temp 98.1 °F (36.7 °C) (Oral)   Resp 20   Ht 193 cm (76\")   Wt 97.9 kg (215 lb 13.3 oz)   SpO2 96%   BMI 26.27 kg/m²     Physical Exam  Vitals and nursing note reviewed.   Constitutional:       General: He is not in acute distress.     Appearance: Normal appearance. " "He is not toxic-appearing.   HENT:      Head: Normocephalic and atraumatic.      Jaw: There is normal jaw occlusion.   Eyes:      General: Lids are normal.      Extraocular Movements: Extraocular movements intact.      Conjunctiva/sclera: Conjunctivae normal.      Pupils: Pupils are equal, round, and reactive to light.   Cardiovascular:      Rate and Rhythm: Normal rate and regular rhythm.      Pulses: Normal pulses.      Heart sounds: Normal heart sounds.   Pulmonary:      Effort: Pulmonary effort is normal. No respiratory distress.      Breath sounds: Normal breath sounds. No wheezing or rhonchi.   Abdominal:      General: Abdomen is flat.      Palpations: Abdomen is soft.      Tenderness: There is no abdominal tenderness. There is no guarding or rebound.   Musculoskeletal:         General: Normal range of motion.      Cervical back: Normal range of motion and neck supple.      Right lower leg: No edema.      Left lower leg: No edema.   Skin:     General: Skin is warm and dry.   Neurological:      Mental Status: He is alert and oriented to person, place, and time. Mental status is at baseline.   Psychiatric:         Mood and Affect: Mood normal.                  ED Course  /95   Pulse 81   Temp 98.1 °F (36.7 °C) (Oral)   Resp 20   Ht 193 cm (76\")   Wt 97.9 kg (215 lb 13.3 oz)   SpO2 96%   BMI 26.27 kg/m²   Results for orders placed or performed during the hospital encounter of 07/19/21   Comprehensive Metabolic Panel    Specimen: Blood   Result Value Ref Range    Glucose 133 (H) 65 - 99 mg/dL    BUN 13 6 - 20 mg/dL    Creatinine 1.13 0.76 - 1.27 mg/dL    Sodium 136 136 - 145 mmol/L    Potassium 3.1 (L) 3.5 - 5.2 mmol/L    Chloride 99 98 - 107 mmol/L    CO2 23.4 22.0 - 29.0 mmol/L    Calcium 9.1 8.6 - 10.5 mg/dL    Total Protein 7.1 6.0 - 8.5 g/dL    Albumin 3.80 3.50 - 5.20 g/dL    ALT (SGPT) 11 1 - 41 U/L    AST (SGOT) 12 1 - 40 U/L    Alkaline Phosphatase 76 39 - 117 U/L    Total Bilirubin 0.3 0.0 - " 1.2 mg/dL    eGFR Non African Amer 67 >60 mL/min/1.73    Globulin 3.3 gm/dL    A/G Ratio 1.2 g/dL    BUN/Creatinine Ratio 11.5 7.0 - 25.0    Anion Gap 13.6 5.0 - 15.0 mmol/L   Troponin    Specimen: Blood   Result Value Ref Range    Troponin T <0.010 0.000 - 0.030 ng/mL   Magnesium    Specimen: Blood   Result Value Ref Range    Magnesium 2.0 1.6 - 2.6 mg/dL   CBC Auto Differential    Specimen: Blood   Result Value Ref Range    WBC 14.40 (H) 3.40 - 10.80 10*3/mm3    RBC 4.67 4.14 - 5.80 10*6/mm3    Hemoglobin 12.9 (L) 13.0 - 17.7 g/dL    Hematocrit 38.7 37.5 - 51.0 %    MCV 82.9 79.0 - 97.0 fL    MCH 27.6 26.6 - 33.0 pg    MCHC 33.3 31.5 - 35.7 g/dL    RDW 12.8 12.3 - 15.4 %    RDW-SD 38.7 37.0 - 54.0 fl    MPV 8.8 6.0 - 12.0 fL    Platelets 607 (H) 140 - 450 10*3/mm3    Neutrophil % 79.1 (H) 42.7 - 76.0 %    Lymphocyte % 7.6 (L) 19.6 - 45.3 %    Monocyte % 5.1 5.0 - 12.0 %    Eosinophil % 7.2 (H) 0.3 - 6.2 %    Basophil % 0.7 0.0 - 1.5 %    Immature Grans % 0.3 0.0 - 0.5 %    Neutrophils, Absolute 11.40 (H) 1.70 - 7.00 10*3/mm3    Lymphocytes, Absolute 1.09 0.70 - 3.10 10*3/mm3    Monocytes, Absolute 0.74 0.10 - 0.90 10*3/mm3    Eosinophils, Absolute 1.03 (H) 0.00 - 0.40 10*3/mm3    Basophils, Absolute 0.10 0.00 - 0.20 10*3/mm3    Immature Grans, Absolute 0.04 0.00 - 0.05 10*3/mm3    nRBC 0.0 0.0 - 0.2 /100 WBC   Scan Slide    Specimen: Blood   Result Value Ref Range    Poikilocytes Slight/1+ None Seen    WBC Morphology Normal Normal    Platelet Estimate Increased Normal    Giant Platelets Slight/1+ None Seen   ECG 12 Lead   Result Value Ref Range    QT Interval 367 ms   Green Top (Gel)   Result Value Ref Range    Extra Tube Hold for add-ons.    Lavender Top   Result Value Ref Range    Extra Tube hold for add-on    Gold Top - SST   Result Value Ref Range    Extra Tube Hold for add-ons.      Medications   sodium chloride 0.9 % flush 10 mL (has no administration in time range)   sodium chloride 0.9 % bolus 1,000 mL (0  mL Intravenous Stopped 7/19/21 2041)   iopamidol (ISOVUE-370) 76 % injection 100 mL (100 mL Intravenous Given 7/19/21 1845)     CT Chest With Contrast Diagnostic    Result Date: 7/19/2021  Narrative: PROCEDURE: CT CHEST W CONTRAST DIAGNOSTIC  COMPARISON:  [facility], CT, CT CHEST LOW DOSE CANCER SCREENING WO, 6/29/2021, 10:35. INDICATIONS: shortness of breath  TECHNIQUE: After obtaining the patient's consent, CT images were obtained with non-ionic intravenous contrast material.   PROTOCOL:   Standard imaging protocol performed    RADIATION:   DLP: 511.5 mGy*cm   Automated exposure control was utilized to minimize radiation dose. CONTRAST: 100 cc Isovue 370 I.V. LABS:   eGFR:  > 60 ml/min/1.73m2  FINDINGS:  There is no evidence of pulmonary embolism.  No adenopathy or effusion is identified.  There is a masslike lesion in the right lung apex measuring 8.0 cm x 5.0 cm by 5.7 cm.  There is partial destruction of posterior right 2nd and 3rd ribs.  The mass overall appears similar to the previous study dated 6/29/2021.  Severe emphysematous changes are noted.  Near the left lung apex is a 0.8 cm nodule, unchanged.  Innumerable pulmonary calcifications are consistent with previous granulomatous disease.  The upper abdomen appears unremarkable.  CONCLUSION:  1. Right apical mass measuring 8.0 cm x 5.0 cm x 5.7 cm consistent with neoplasm.  There is destruction of the posterior 2nd and 3rd ribs by the mass. 2. Severe emphysematous changes 3. No evidence of pulmonary embolism 4. Stable left upper lobe 0.8 cm nodule.  Metastatic disease is not excluded.     Ted Singh M.D.       Electronically Signed and Approved By: Ted Singh M.D. on 7/19/2021 at 20:13             XR Chest 1 View    Result Date: 7/19/2021  Narrative: PROCEDURE: XR CHEST 1 VW  COMPARISON: [facility], CT, CT CHEST LOW DOSE CANCER SCREENING WO, 6/29/2021, 10:35.  [facility], CR, CHEST PA/AP & LAT  2V, 5/20/2021, 14:53.  INDICATIONS: Weak/Dizzy/AMS triage protocol  FINDINGS:  There is abnormal soft tissue density in the right lung apex.  There is destruction of the posterior right 2nd and 3rd ribs.  Pleural thickening is noted in the apex.  The abnormal opacity together measures 5.1 cm x 4.9 cm.  There are innumerable pulmonary calcifications in the lung fields bilaterally consistent with previous granulomatous disease or varicella pneumonia.  The lungs are otherwise clear.  The cardiac and mediastinal silhouettes appear normal.  No effusion is present.   CONCLUSION:  1. Right apical lesion consistent with neoplasm with associated destruction of the posterior 2nd and 3rd ribs       Ted Singh M.D.       Electronically Signed and Approved By: Ted Singh M.D. on 7/19/2021 at 17:45             CT Chest Low Dose Cancer Screening WO    Result Date: 6/29/2021  Narrative: PROCEDURE: CT CHEST LOW DOSE CANCER SCREENING WO  COMPARISON: [facility], , CHEST PA/AP & LAT 2V, 5/20/2021, 14:53.  REASON FOR SCREENING: Patient is 56 years of age, asymptomatic, and has a smoking history of more than 30 pack years. SMOKING STATUS: Current smoker.     SCREENING VISIT: This is the patient's 1st low-dose screening CT scan of the chest.     TECHNIQUE: Axial unenhanced LDCT images from the apices through mid-kidney were obtained. Evaluation of solid organs and vascular structures is suboptimal due to the lack of IV contrast. Imaging was performed on a Siemens Emotion 16 CT scanner.  RADIATION: CT Dose Index Vol (CTDIvol):  7.54  mGy  Dose Length Product (DLP):  295  mGy-cm  DIAGNOSTIC QUALITY: Satisfactory  LUNG-RADS CLASSIFICATION: Lung RADS 4X  FINDINGS:  Lung window images reveal marked emphysema.  8.5 cm x 4.5 cm mass is seen at the right lung apex, with destruction of posterior 1st and 2nd ribs consistent with bronchogenic malignancy or lung cancer.  Scattered tiny calcified granulomas are of no clinical  significance.  No mediastinal or axillary adenopathy is evident.  A few coronary artery calcifications are evident.  CONTINUED ON NEXT PAGE...    CONCLUSION:  Low-dose screening CT scan of the chest demonstrating 8.5 cm mass at the right lung apex with destruction of posterior 1st and 2nd ribs consistent with bronchogenic malignancy or lung cancer.  Lung RADS 4X      MIKO HOWARD MD       Electronically Signed and Approved By: MIKO HOWARD MD on 6/29/2021 at 14:35               Procedures/EKGs:  Procedures    EKG:    Rhythm: sinus  Rate: 81  Axis: normal  Intervals: normal  ST Segment: no elevations    EKG Comparison: unchanged    Interpreted by me        Medical Decision Making:                         MDM  Number of Diagnoses or Management Options  Diagnosis management comments: The patient had an EKG that shows no acute changes. Specifically, there are no ST elevations, t-wave changes of concern, delta waves, or rhythm abnormalities warranting admission. The patient was placed on the cardiac monitor and observed with continuous telemetry. The patient has a chest x-ray that is negative for pneumothorax, pneumonia, and is essentially unremarkable. The patient has had unelevated troponins on blood draw.      The patient is resting comfortably and feels better, is alert and in no distress.  The patient´s CBC was reviewed and shows no abnormalities of critical concern. Of note, there is no anemia requiring a blood transfusion and the platelet count is acceptable.  The patient´s CMP was reviewed and shows no abnormalities of critical concern. Of note, the patient´s sodium and potassium are acceptable. The patient´s liver enzymes are unremarkable. The patient´s renal function (creatinine) is preserved. The patient has a normal anion gap.  Magnesium is 2.0.  Troponin is negative.  CT scan of the chest is negative for pulmonary embolism.  It does show the patient's known mass that he has a biopsy scheduled for  currently.  The repeat examination is unremarkable and benign. Electrocardiogram shows no signs of acute ischemia and the history, exam, diagnostic testing and current condition did not suggest that this patient is having an acute myocardial infarction, significant arrhythmia, unstable angina, esophageal perforation, pulmonary embolism, aortic dissection, severe pneumonia, sepsis for other significant pathology that would warrant further testing, continued ED treatment, admission, cardiology or other specialist consultation at this point. The vital signs have been stable. The patient's condition is stable and appropriate for discharge. The patient will pursue further outpatient evaluation with the primary care physician, or designated physician or cardiologist. The patient has expressed a clear and thorough understanding and agreed to follow-up as instructed.       Amount and/or Complexity of Data Reviewed  Clinical lab tests: ordered and reviewed  Tests in the medicine section of CPT®: ordered and reviewed    Risk of Complications, Morbidity, and/or Mortality  Presenting problems: moderate  Management options: moderate    Patient Progress  Patient progress: stable       Final diagnoses:   PSVT (paroxysmal supraventricular tachycardia) (CMS/[facility])          Disposition:  ED Disposition     ED Disposition Condition Comment    Discharge Stable           Documentation assistance provided by Stacey Means acting as scribe for Gold Bynum MD. Information recorded by the scribe was done at my direction and has been verified and validated by me.        Stacey Means  07/19/21 0897       Gold Bynum MD  07/19/21 1771

## 2021-07-20 ENCOUNTER — TELEPHONE (OUTPATIENT)
Dept: FAMILY MEDICINE CLINIC | Facility: CLINIC | Age: 56
End: 2021-07-20

## 2021-07-20 DIAGNOSIS — R91.8 MASS OF RIGHT LUNG: ICD-10-CM

## 2021-07-20 DIAGNOSIS — R91.8 LUNG MASS: Primary | ICD-10-CM

## 2021-07-20 LAB
QT INTERVAL: 355 MS
QT INTERVAL: 367 MS

## 2021-07-20 RX ORDER — TRAMADOL HYDROCHLORIDE 50 MG/1
50 TABLET ORAL EVERY 6 HOURS PRN
Qty: 120 TABLET | Refills: 0 | Status: SHIPPED | OUTPATIENT
Start: 2021-07-20

## 2021-07-20 NOTE — TELEPHONE ENCOUNTER
I called wife and spoke with her regarding pain management for pt.  Pt having pain with the lung mass.  I refilled tramadol for 1 month and wife will add tylenol 500mg QID with tramadol for further pain control.  Wife agrees with this.  I will refer pt to pain management, as recommended by pulmonology for pain control.  Wife agrees.

## 2021-07-21 ENCOUNTER — APPOINTMENT (OUTPATIENT)
Dept: LAB | Facility: HOSPITAL | Age: 56
End: 2021-07-21

## 2021-07-26 ENCOUNTER — APPOINTMENT (OUTPATIENT)
Dept: CT IMAGING | Facility: HOSPITAL | Age: 56
End: 2021-07-26

## 2021-07-30 DIAGNOSIS — M54.9 BACK PAIN, UNSPECIFIED BACK LOCATION, UNSPECIFIED BACK PAIN LATERALITY, UNSPECIFIED CHRONICITY: ICD-10-CM

## 2021-07-30 RX ORDER — IBUPROFEN 200 MG
200 TABLET ORAL EVERY 6 HOURS PRN
Qty: 60 TABLET | Refills: 2 | Status: SHIPPED | OUTPATIENT
Start: 2021-07-30

## 2021-07-30 RX ORDER — IBUPROFEN 200 MG
TABLET ORAL
Qty: 50 TABLET | Refills: 1 | OUTPATIENT
Start: 2021-07-30

## 2021-07-30 RX ORDER — CYCLOBENZAPRINE HCL 10 MG
10 TABLET ORAL 3 TIMES DAILY PRN
Qty: 90 TABLET | Refills: 1 | Status: SHIPPED | OUTPATIENT
Start: 2021-07-30

## 2021-08-02 RX ORDER — DULOXETIN HYDROCHLORIDE 30 MG/1
CAPSULE, DELAYED RELEASE ORAL
Qty: 30 CAPSULE | Refills: 2 | Status: SHIPPED | OUTPATIENT
Start: 2021-08-02

## 2021-08-16 RX ORDER — CALCIUM CARBONATE 750 MG/1
TABLET ORAL
Qty: 60 TABLET | Refills: 0 | OUTPATIENT
Start: 2021-08-16

## 2021-08-16 NOTE — TELEPHONE ENCOUNTER
LAST ACUTE VISIT 7/1/21  FIRST ESTABLISHED VISIT 5/20/21 NAPROXEN WAS NOT GIVEN ON MEDICINE LIST. COULD HAVE FORGOTTEN IT.  PHARMACY KEEPS SENDING REQUEST TO DR CHRISTOPHER, BUT HE HAS NEVER SEEN DR CHRISTOPHER

## 2021-10-13 ENCOUNTER — APPOINTMENT (OUTPATIENT)
Dept: RESPIRATORY THERAPY | Facility: HOSPITAL | Age: 56
End: 2021-10-13

## 2021-10-15 ENCOUNTER — APPOINTMENT (OUTPATIENT)
Dept: RESPIRATORY THERAPY | Facility: HOSPITAL | Age: 56
End: 2021-10-15

## 2021-10-29 RX ORDER — DULOXETIN HYDROCHLORIDE 30 MG/1
CAPSULE, DELAYED RELEASE ORAL
Qty: 90 CAPSULE | OUTPATIENT
Start: 2021-10-29